# Patient Record
Sex: MALE | Race: WHITE | NOT HISPANIC OR LATINO | URBAN - METROPOLITAN AREA
[De-identification: names, ages, dates, MRNs, and addresses within clinical notes are randomized per-mention and may not be internally consistent; named-entity substitution may affect disease eponyms.]

---

## 2017-02-17 ENCOUNTER — RECORDS - HEALTHEAST (OUTPATIENT)
Dept: LAB | Facility: CLINIC | Age: 67
End: 2017-02-17

## 2017-02-17 LAB
CHOLEST SERPL-MCNC: 133 MG/DL
FASTING STATUS PATIENT QL REPORTED: YES
HDLC SERPL-MCNC: 39 MG/DL
LDLC SERPL CALC-MCNC: 62 MG/DL
TRIGL SERPL-MCNC: 159 MG/DL

## 2017-03-01 ENCOUNTER — RECORDS - HEALTHEAST (OUTPATIENT)
Dept: LAB | Facility: CLINIC | Age: 67
End: 2017-03-01

## 2017-03-01 LAB — PSA SERPL-MCNC: 2.9 NG/ML (ref 0–4.5)

## 2017-07-28 ENCOUNTER — ANESTHESIA - HEALTHEAST (OUTPATIENT)
Dept: SURGERY | Facility: HOSPITAL | Age: 67
End: 2017-07-28

## 2017-07-28 ENCOUNTER — SURGERY - HEALTHEAST (OUTPATIENT)
Dept: SURGERY | Facility: HOSPITAL | Age: 67
End: 2017-07-28

## 2017-08-14 ENCOUNTER — ANESTHESIA - HEALTHEAST (OUTPATIENT)
Dept: SURGERY | Facility: HOSPITAL | Age: 67
End: 2017-08-14

## 2017-08-14 ENCOUNTER — SURGERY - HEALTHEAST (OUTPATIENT)
Dept: SURGERY | Facility: HOSPITAL | Age: 67
End: 2017-08-14

## 2017-08-14 ASSESSMENT — MIFFLIN-ST. JEOR: SCORE: 1890.24

## 2017-09-05 ENCOUNTER — RECORDS - HEALTHEAST (OUTPATIENT)
Dept: LAB | Facility: CLINIC | Age: 67
End: 2017-09-05

## 2017-09-05 LAB
CHOLEST SERPL-MCNC: 135 MG/DL
FASTING STATUS PATIENT QL REPORTED: ABNORMAL
HDLC SERPL-MCNC: 36 MG/DL
LDLC SERPL CALC-MCNC: 71 MG/DL
TRIGL SERPL-MCNC: 141 MG/DL

## 2018-03-22 ENCOUNTER — RECORDS - HEALTHEAST (OUTPATIENT)
Dept: LAB | Facility: CLINIC | Age: 68
End: 2018-03-22

## 2018-03-22 LAB
ANION GAP SERPL CALCULATED.3IONS-SCNC: 10 MMOL/L (ref 5–18)
BUN SERPL-MCNC: 22 MG/DL (ref 8–22)
CALCIUM SERPL-MCNC: 9.7 MG/DL (ref 8.5–10.5)
CHLORIDE BLD-SCNC: 107 MMOL/L (ref 98–107)
CHOLEST SERPL-MCNC: 135 MG/DL
CO2 SERPL-SCNC: 23 MMOL/L (ref 22–31)
CREAT SERPL-MCNC: 1.04 MG/DL (ref 0.7–1.3)
FASTING STATUS PATIENT QL REPORTED: ABNORMAL
GFR SERPL CREATININE-BSD FRML MDRD: >60 ML/MIN/1.73M2
GLUCOSE BLD-MCNC: 181 MG/DL (ref 70–125)
HDLC SERPL-MCNC: 34 MG/DL
LDLC SERPL CALC-MCNC: 65 MG/DL
MAGNESIUM SERPL-MCNC: 1.6 MG/DL (ref 1.8–2.6)
POTASSIUM BLD-SCNC: 4.4 MMOL/L (ref 3.5–5)
PSA SERPL-MCNC: 3.2 NG/ML (ref 0–4.5)
SODIUM SERPL-SCNC: 140 MMOL/L (ref 136–145)
TRIGL SERPL-MCNC: 182 MG/DL

## 2018-05-07 ENCOUNTER — COMMUNICATION - HEALTHEAST (OUTPATIENT)
Dept: UROLOGY | Facility: CLINIC | Age: 68
End: 2018-05-07

## 2018-05-09 ENCOUNTER — RECORDS - HEALTHEAST (OUTPATIENT)
Dept: ADMINISTRATIVE | Facility: OTHER | Age: 68
End: 2018-05-09

## 2018-05-21 ENCOUNTER — HOSPITAL ENCOUNTER (OUTPATIENT)
Dept: CT IMAGING | Facility: HOSPITAL | Age: 68
Discharge: HOME | End: 2018-05-21
Attending: UROLOGY

## 2018-05-21 DIAGNOSIS — N20.2 CALCULUS OF KIDNEY AND URETER: ICD-10-CM

## 2018-07-02 ENCOUNTER — RECORDS - HEALTHEAST (OUTPATIENT)
Dept: LAB | Facility: CLINIC | Age: 68
End: 2018-07-02

## 2018-07-02 LAB — MAGNESIUM SERPL-MCNC: 1.6 MG/DL (ref 1.8–2.6)

## 2018-09-24 ENCOUNTER — RECORDS - HEALTHEAST (OUTPATIENT)
Dept: LAB | Facility: CLINIC | Age: 68
End: 2018-09-24

## 2018-09-24 LAB
ANION GAP SERPL CALCULATED.3IONS-SCNC: 10 MMOL/L (ref 5–18)
BUN SERPL-MCNC: 16 MG/DL (ref 8–22)
CALCIUM SERPL-MCNC: 10.6 MG/DL (ref 8.5–10.5)
CHLORIDE BLD-SCNC: 106 MMOL/L (ref 98–107)
CHOLEST SERPL-MCNC: 134 MG/DL
CO2 SERPL-SCNC: 25 MMOL/L (ref 22–31)
CREAT SERPL-MCNC: 1.2 MG/DL (ref 0.7–1.3)
FASTING STATUS PATIENT QL REPORTED: ABNORMAL
GFR SERPL CREATININE-BSD FRML MDRD: 60 ML/MIN/1.73M2
GLUCOSE BLD-MCNC: 160 MG/DL (ref 70–125)
HDLC SERPL-MCNC: 41 MG/DL
IRON SATN MFR SERPL: 26 % (ref 20–50)
IRON SERPL-MCNC: 95 UG/DL (ref 42–175)
LDLC SERPL CALC-MCNC: 59 MG/DL
MAGNESIUM SERPL-MCNC: 2.1 MG/DL (ref 1.8–2.6)
POTASSIUM BLD-SCNC: 4.6 MMOL/L (ref 3.5–5)
SODIUM SERPL-SCNC: 141 MMOL/L (ref 136–145)
TIBC SERPL-MCNC: 371 UG/DL (ref 313–563)
TRANSFERRIN SERPL-MCNC: 297 MG/DL (ref 212–360)
TRIGL SERPL-MCNC: 168 MG/DL

## 2019-04-09 ENCOUNTER — RECORDS - HEALTHEAST (OUTPATIENT)
Dept: LAB | Facility: CLINIC | Age: 69
End: 2019-04-09

## 2019-04-09 LAB
ANION GAP SERPL CALCULATED.3IONS-SCNC: 12 MMOL/L (ref 5–18)
BUN SERPL-MCNC: 21 MG/DL (ref 8–28)
CALCIUM SERPL-MCNC: 10.1 MG/DL (ref 8.5–10.5)
CHLORIDE BLD-SCNC: 107 MMOL/L (ref 98–107)
CHOLEST SERPL-MCNC: 139 MG/DL
CO2 SERPL-SCNC: 22 MMOL/L (ref 22–31)
CREAT SERPL-MCNC: 1.27 MG/DL (ref 0.7–1.3)
FASTING STATUS PATIENT QL REPORTED: YES
GFR SERPL CREATININE-BSD FRML MDRD: 56 ML/MIN/1.73M2
GLUCOSE BLD-MCNC: 235 MG/DL (ref 70–125)
HDLC SERPL-MCNC: 37 MG/DL
LDLC SERPL CALC-MCNC: 67 MG/DL
POTASSIUM BLD-SCNC: 4.9 MMOL/L (ref 3.5–5)
SODIUM SERPL-SCNC: 141 MMOL/L (ref 136–145)
TRIGL SERPL-MCNC: 177 MG/DL

## 2019-10-10 ENCOUNTER — RECORDS - HEALTHEAST (OUTPATIENT)
Dept: LAB | Facility: CLINIC | Age: 69
End: 2019-10-10

## 2019-10-10 LAB
ANION GAP SERPL CALCULATED.3IONS-SCNC: 7 MMOL/L (ref 5–18)
BUN SERPL-MCNC: 21 MG/DL (ref 8–28)
CALCIUM SERPL-MCNC: 13.5 MG/DL (ref 8.5–10.5)
CHLORIDE BLD-SCNC: 107 MMOL/L (ref 98–107)
CHOLEST SERPL-MCNC: 139 MG/DL
CO2 SERPL-SCNC: 25 MMOL/L (ref 22–31)
CREAT SERPL-MCNC: 1.38 MG/DL (ref 0.7–1.3)
FASTING STATUS PATIENT QL REPORTED: ABNORMAL
GFR SERPL CREATININE-BSD FRML MDRD: 51 ML/MIN/1.73M2
GLUCOSE BLD-MCNC: 172 MG/DL (ref 70–125)
HDLC SERPL-MCNC: 43 MG/DL
LDLC SERPL CALC-MCNC: 54 MG/DL
POTASSIUM BLD-SCNC: 4.9 MMOL/L (ref 3.5–5)
SODIUM SERPL-SCNC: 139 MMOL/L (ref 136–145)
TRIGL SERPL-MCNC: 211 MG/DL

## 2019-10-11 ENCOUNTER — RECORDS - HEALTHEAST (OUTPATIENT)
Dept: LAB | Facility: CLINIC | Age: 69
End: 2019-10-11

## 2019-10-11 LAB
ALBUMIN SERPL-MCNC: 3.9 G/DL (ref 3.5–5)
ANION GAP SERPL CALCULATED.3IONS-SCNC: 9 MMOL/L (ref 5–18)
BUN SERPL-MCNC: 28 MG/DL (ref 8–28)
CALCIUM SERPL-MCNC: 12.8 MG/DL (ref 8.5–10.5)
CALCIUM, IONIZED MEASURED: 1.62 MMOL/L (ref 1.11–1.3)
CHLORIDE BLD-SCNC: 105 MMOL/L (ref 98–107)
CO2 SERPL-SCNC: 22 MMOL/L (ref 22–31)
CREAT SERPL-MCNC: 1.42 MG/DL (ref 0.7–1.3)
GFR SERPL CREATININE-BSD FRML MDRD: 49 ML/MIN/1.73M2
GLUCOSE BLD-MCNC: 212 MG/DL (ref 70–125)
ION CA PH 7.4: 1.62 MMOL/L (ref 1.11–1.3)
PH: 7.4 (ref 7.35–7.45)
PHOSPHATE SERPL-MCNC: 2.5 MG/DL (ref 2.5–4.5)
POTASSIUM BLD-SCNC: 4.3 MMOL/L (ref 3.5–5)
PTH-INTACT SERPL-MCNC: 258 PG/ML (ref 10–86)
SODIUM SERPL-SCNC: 136 MMOL/L (ref 136–145)

## 2019-10-14 LAB — 25(OH)D3 SERPL-MCNC: 18.5 NG/ML (ref 30–80)

## 2019-10-31 ENCOUNTER — RECORDS - HEALTHEAST (OUTPATIENT)
Dept: LAB | Facility: CLINIC | Age: 69
End: 2019-10-31

## 2019-10-31 LAB
ALBUMIN SERPL-MCNC: 4.3 G/DL (ref 3.5–5)
ANION GAP SERPL CALCULATED.3IONS-SCNC: 10 MMOL/L (ref 5–18)
BUN SERPL-MCNC: 17 MG/DL (ref 8–28)
CALCIUM SERPL-MCNC: 12.1 MG/DL (ref 8.5–10.5)
CHLORIDE BLD-SCNC: 107 MMOL/L (ref 98–107)
CO2 SERPL-SCNC: 22 MMOL/L (ref 22–31)
CREAT SERPL-MCNC: 1.22 MG/DL (ref 0.7–1.3)
GFR SERPL CREATININE-BSD FRML MDRD: 59 ML/MIN/1.73M2
GLUCOSE BLD-MCNC: 153 MG/DL (ref 70–125)
PHOSPHATE SERPL-MCNC: 2.8 MG/DL (ref 2.5–4.5)
POTASSIUM BLD-SCNC: 4.9 MMOL/L (ref 3.5–5)
PTH-INTACT SERPL-MCNC: 250 PG/ML (ref 10–86)
SODIUM SERPL-SCNC: 139 MMOL/L (ref 136–145)

## 2019-11-01 LAB — 25(OH)D3 SERPL-MCNC: 20.4 NG/ML (ref 30–80)

## 2019-11-25 ENCOUNTER — RECORDS - HEALTHEAST (OUTPATIENT)
Dept: LAB | Facility: CLINIC | Age: 69
End: 2019-11-25

## 2019-11-26 LAB
25(OH)D3 SERPL-MCNC: 25.3 NG/ML (ref 30–80)
ALBUMIN SERPL-MCNC: 4.3 G/DL (ref 3.5–5)
ANION GAP SERPL CALCULATED.3IONS-SCNC: 12 MMOL/L (ref 5–18)
BUN SERPL-MCNC: 21 MG/DL (ref 8–28)
CALCIUM SERPL-MCNC: 12 MG/DL (ref 8.5–10.5)
CHLORIDE BLD-SCNC: 108 MMOL/L (ref 98–107)
CO2 SERPL-SCNC: 21 MMOL/L (ref 22–31)
CREAT SERPL-MCNC: 1.27 MG/DL (ref 0.7–1.3)
GFR SERPL CREATININE-BSD FRML MDRD: 56 ML/MIN/1.73M2
GLUCOSE BLD-MCNC: 135 MG/DL (ref 70–125)
PHOSPHATE SERPL-MCNC: 3.1 MG/DL (ref 2.5–4.5)
POTASSIUM BLD-SCNC: 4.6 MMOL/L (ref 3.5–5)
SODIUM SERPL-SCNC: 141 MMOL/L (ref 136–145)

## 2020-01-28 ENCOUNTER — RECORDS - HEALTHEAST (OUTPATIENT)
Dept: LAB | Facility: CLINIC | Age: 70
End: 2020-01-28

## 2020-01-29 LAB — 25(OH)D3 SERPL-MCNC: 44.7 NG/ML (ref 30–80)

## 2020-02-04 ENCOUNTER — RECORDS - HEALTHEAST (OUTPATIENT)
Dept: LAB | Facility: CLINIC | Age: 70
End: 2020-02-04

## 2020-02-04 LAB — PTH-INTACT SERPL-MCNC: 283 PG/ML (ref 10–86)

## 2020-05-05 ENCOUNTER — RECORDS - HEALTHEAST (OUTPATIENT)
Dept: LAB | Facility: CLINIC | Age: 70
End: 2020-05-05

## 2020-05-05 LAB
ALBUMIN SERPL-MCNC: 4.1 G/DL (ref 3.5–5)
ANION GAP SERPL CALCULATED.3IONS-SCNC: 10 MMOL/L (ref 5–18)
BUN SERPL-MCNC: 17 MG/DL (ref 8–28)
CALCIUM SERPL-MCNC: 11.7 MG/DL (ref 8.5–10.5)
CHLORIDE BLD-SCNC: 108 MMOL/L (ref 98–107)
CHOLEST SERPL-MCNC: 137 MG/DL
CO2 SERPL-SCNC: 21 MMOL/L (ref 22–31)
CREAT SERPL-MCNC: 1.18 MG/DL (ref 0.7–1.3)
CREAT UR-MCNC: 190.3 MG/DL
FASTING STATUS PATIENT QL REPORTED: ABNORMAL
GFR SERPL CREATININE-BSD FRML MDRD: >60 ML/MIN/1.73M2
GLUCOSE BLD-MCNC: 122 MG/DL (ref 70–125)
HDLC SERPL-MCNC: 42 MG/DL
LDLC SERPL CALC-MCNC: 59 MG/DL
MICROALBUMIN UR-MCNC: 8.8 MG/DL (ref 0–1.99)
MICROALBUMIN/CREAT UR: 46.2 MG/G
PHOSPHATE SERPL-MCNC: 3.1 MG/DL (ref 2.5–4.5)
POTASSIUM BLD-SCNC: 4.4 MMOL/L (ref 3.5–5)
PTH-INTACT SERPL-MCNC: 224 PG/ML (ref 10–86)
SODIUM SERPL-SCNC: 139 MMOL/L (ref 136–145)
TRIGL SERPL-MCNC: 182 MG/DL

## 2020-05-06 LAB — PSA SERPL-MCNC: 4 NG/ML (ref 0–6.5)

## 2020-05-08 LAB — 25(OH)D3 SERPL-MCNC: 36.2 NG/ML (ref 30–80)

## 2021-05-26 ENCOUNTER — RECORDS - HEALTHEAST (OUTPATIENT)
Dept: ADMINISTRATIVE | Facility: CLINIC | Age: 71
End: 2021-05-26

## 2021-05-29 ENCOUNTER — RECORDS - HEALTHEAST (OUTPATIENT)
Dept: ADMINISTRATIVE | Facility: CLINIC | Age: 71
End: 2021-05-29

## 2021-05-31 VITALS — BODY MASS INDEX: 35.79 KG/M2 | HEIGHT: 70 IN | WEIGHT: 250 LBS

## 2021-05-31 VITALS — WEIGHT: 260.3 LBS | HEIGHT: 71 IN | BODY MASS INDEX: 36.44 KG/M2

## 2021-06-12 NOTE — ANESTHESIA POSTPROCEDURE EVALUATION
Patient: Aryan Lam  CYSTOSCOPY, BILATERAL URETEROSCOPY HOLMIUM LASER LITHOTRIPSY BASKET EXTRACTION OF STONE BILATERAL STENT EXCHANGE  Anesthesia type: general    Patient location: PACU  Last vitals:   Vitals:    08/14/17 1613   BP: 112/69   Pulse: 76   Resp: 14   Temp: 36.9  C (98.4  F)   SpO2: 99%     Post vital signs: stable  Level of consciousness: alert, conversing and drinking water  Post-anesthesia pain: pain controlled  Post-anesthesia nausea and vomiting: no  Pulmonary: room air  Cardiovascular: stable and blood pressure at baseline  Hydration: adequate  Anesthetic events: no    QCDR Measures:  ASA# 11 - Stephanie-op Cardiac Arrest: ASA11B - Patient did NOT experience unanticipated cardiac arrest  ASA# 12 - Stephanie-op Mortality Rate: ASA12B - Patient did NOT die  ASA# 13 - PACU Re-Intubation Rate: ASA13B - Patient did NOT require a new airway mgmt  ASA# 10 - Composite Anes Safety: ASA10A - No serious adverse event  ASA# 38 - New Corneal Injury: ASA38A - No new exposure keratitis or corneal abrasion in PACU    Additional Notes:

## 2021-06-12 NOTE — ANESTHESIA PREPROCEDURE EVALUATION
Anesthesia Evaluation      Patient summary reviewed   No history of anesthetic complications     Airway   Mallampati: II  Neck ROM: full   Pulmonary - normal exam    breath sounds clear to auscultation  (-) shortness of breath, recent URI, sleep apnea                         Cardiovascular - normal exam  Exercise tolerance: > or = 4 METS  (+) hypertension, CAD, CABG/stent, cardiomyopathy,     (-) angina  ECG reviewed  Rhythm: regular  Rate: normal,         Neuro/Psych    (-) no seizures, no neuromuscular disease, no CVA    Endo/Other    (+) diabetes mellitus type 2, obesity,      GI/Hepatic/Renal    (+)   chronic renal disease ARF,      Other findings: YVES recovering      Dental    (+) caps and chipped                       Anesthesia Plan  Planned anesthetic: general LMA  Magnesium replacement for serum level 1.5  ASA 3     Anesthetic plan and risks discussed with: patient  Anesthesia plan special considerations: antiemetics,   Post-op plan: routine recovery

## 2021-06-12 NOTE — ANESTHESIA CARE TRANSFER NOTE
Last vitals:   Vitals:    08/14/17 1613   BP: 112/69   Pulse: 76   Resp: 14   Temp: 36.9  C (98.4  F)   SpO2: 99%     Patient's level of consciousness is drowsy  Spontaneous respirations: yes  Maintains airway independently: yes  Dentition unchanged: yes  Oropharynx: oropharynx clear of all foreign objects    QCDR Measures:  ASA# 20 - Surgical Safety Checklist: WHO surgical safety checklist completed prior to induction  PQRS# 430 - Adult PONV Prevention: 4558F - Pt received => 2 anti-emetic agents (different classes) preop & intraop  ASA# 8 - Peds PONV Prevention: NA - Not pediatric patient, not GA or 2 or more risk factors NOT present  PQRS# 424 - Stephanie-op Temp Management: 4559F - At least one body temp DOCUMENTED => 35.5C or 95.9F within required timeframe  PQRS# 426 - PACU Transfer Protocol: - Transfer of care checklist used  ASA# 14 - Acute Post-op Pain: ASA14B - Patient did NOT experience pain >= 7 out of 10

## 2021-06-12 NOTE — ANESTHESIA POSTPROCEDURE EVALUATION
Patient: Aryan A [place]  CYSTOSCOPY, WITH RETROGRADES, BILATERAL STENT INSERTION  Anesthesia type: general    Patient location: PACU  Last vitals:   Vitals:    07/28/17 2140   BP: 125/80   Pulse: 85   Resp: 20   Temp: 37.1  C (98.8  F)   SpO2: 98%     Post vital signs: stable  Level of consciousness: awake and responds to simple questions  Post-anesthesia pain: pain controlled  Post-anesthesia nausea and vomiting: no  Pulmonary: unassisted, return to baseline  Cardiovascular: stable and blood pressure at baseline  Hydration: adequate  Anesthetic events: no    QCDR Measures:  ASA# 11 - Stephanie-op Cardiac Arrest: ASA11B - Patient did NOT experience unanticipated cardiac arrest  ASA# 12 - Stephanie-op Mortality Rate: ASA12B - Patient did NOT die  ASA# 13 - PACU Re-Intubation Rate: ASA13B - Patient did NOT require a new airway mgmt  ASA# 10 - Composite Anes Safety: ASA10A - No serious adverse event  ASA# 38 - New Corneal Injury: ASA38A - No new exposure keratitis or corneal abrasion in PACU    Additional Notes:

## 2021-06-12 NOTE — ANESTHESIA PREPROCEDURE EVALUATION
Anesthesia Evaluation        Airway   Mallampati: IV  Neck ROM: limited   Pulmonary - normal exam                          Cardiovascular   (+) CAD, dysrhythmias (Patient states he was told he had an irregular rhythm but is unsure what this is.  Will obtain EKG.), ,     Rhythm: regular  Rate: normal,         Neuro/Psych      Endo/Other    (+) diabetes mellitus,      GI/Hepatic/Renal    (+)   chronic renal disease (Stage 3, now in anuric failure) CRI,      Other findings: Per H&P:    1.  Acute kidney injury  --- Concern for low urine output with history of recent fall onto his leg and decreased oral intake suspicious for prerenal etiology.  And obstruction with hydronephrosis and hydroureter and concern for potential infection due to dehydration and urolithiasis and ureterolithiasis contributing   -patient anuric - did discuss with urology planning to see tonight  ---NPO  ---continue IVF   ---get UA and start rocephin  ---place cline  ---with injury plan CK  --- post nephrology for am  ---hold ace and metformin  ---check BC     #DM  ---whil NPO use ISS  ---last A1c 7.0  ---hold albiglutide and amaryl     #Hyperlipidemia  ---holding statin     #CAD  ---remote stenting - one stent  ---holdling asa and ACE  ---continue toprol with holding parameters     #CKD - III    Obese,       Dental - normal exam                        Anesthesia Plan  Planned anesthetic: general endotracheal  Patient ate a meal at 4:30 pm, but given the emergent nature of is anuria this trumps NPO guidelines.  Will do a true rapid sequence intubation but must obtain a new CMP to check potassium level prior to induction.  Patient is also a diabetic with less than ideal control and a possible difficult intubation.  Will treat his BG and limit decadron use.  Plan for Glidescope for intubation.    Addendum:  EKG Sinus rhythm, PVS and possible LAFB with some conduction abnormalities.  Stable.  Normal rate.      ASA 4 - emergent   Induction: intravenous    Anesthetic plan and risks discussed with: patient  Anesthesia plan special considerations: video-assisted, rapid sequence induction, increased risk of difficult airway,   Post-op plan: routine recovery

## 2021-06-12 NOTE — ANESTHESIA CARE TRANSFER NOTE
Last vitals:   Vitals:    07/28/17 2058   BP: 118/66   Pulse: 90   Resp: 16   Temp: 36.9  C (98.4  F)   SpO2: 99%     Patient's level of consciousness is drowsy  Spontaneous respirations: yes  Maintains airway independently: yes  Dentition unchanged: yes  Oropharynx: oropharynx clear of all foreign objects    QCDR Measures:  ASA# 20 - Surgical Safety Checklist: WHO surgical safety checklist completed prior to induction  PQRS# 430 - Adult PONV Prevention: 4558F - Pt received => 2 anti-emetic agents (different classes) preop & intraop  ASA# 8 - Peds PONV Prevention: NA - Not pediatric patient, not GA or 2 or more risk factors NOT present  PQRS# 424 - Stephanie-op Temp Management: 4559F - At least one body temp DOCUMENTED => 35.5C or 95.9F within required timeframe  PQRS# 426 - PACU Transfer Protocol: - Transfer of care checklist used  ASA# 14 - Acute Post-op Pain: ASA14B - Patient did NOT experience pain >= 7 out of 10

## 2021-06-16 PROBLEM — I25.83 CORONARY ARTERY DISEASE DUE TO LIPID RICH PLAQUE: Status: ACTIVE | Noted: 2017-07-28

## 2021-06-16 PROBLEM — I25.10 CORONARY ARTERY DISEASE DUE TO LIPID RICH PLAQUE: Status: ACTIVE | Noted: 2017-07-28

## 2021-06-16 PROBLEM — E78.00 PURE HYPERCHOLESTEROLEMIA: Status: ACTIVE | Noted: 2017-07-28

## 2021-06-16 PROBLEM — E11.8 CONTROLLED DIABETES MELLITUS TYPE 2 WITH COMPLICATIONS (H): Status: ACTIVE | Noted: 2017-07-28

## 2021-06-16 PROBLEM — N17.9 ACUTE KIDNEY INJURY (H): Status: ACTIVE | Noted: 2017-07-28
